# Patient Record
Sex: MALE | Race: WHITE
[De-identification: names, ages, dates, MRNs, and addresses within clinical notes are randomized per-mention and may not be internally consistent; named-entity substitution may affect disease eponyms.]

---

## 2021-07-07 ENCOUNTER — HOSPITAL ENCOUNTER (OUTPATIENT)
Dept: HOSPITAL 35 - CAT | Age: 71
End: 2021-07-07
Attending: INTERNAL MEDICINE
Payer: COMMERCIAL

## 2021-07-07 DIAGNOSIS — I48.91: ICD-10-CM

## 2021-07-07 DIAGNOSIS — I25.10: Primary | ICD-10-CM

## 2021-07-07 DIAGNOSIS — Z95.810: ICD-10-CM

## 2021-07-07 DIAGNOSIS — M47.814: ICD-10-CM

## 2021-07-07 LAB
ALBUMIN SERPL-MCNC: 3.6 G/DL (ref 3.4–5)
ALT SERPL-CCNC: 29 U/L (ref 30–65)
ANION GAP SERPL CALC-SCNC: 6 MMOL/L (ref 7–16)
AST SERPL-CCNC: 20 U/L (ref 15–37)
BASOPHILS NFR BLD AUTO: 0.7 % (ref 0–2)
BILIRUB SERPL-MCNC: 0.4 MG/DL (ref 0.2–1)
BUN SERPL-MCNC: 28 MG/DL (ref 7–18)
CALCIUM SERPL-MCNC: 8.8 MG/DL (ref 8.5–10.1)
CHLORIDE SERPL-SCNC: 110 MMOL/L (ref 98–107)
CO2 SERPL-SCNC: 28 MMOL/L (ref 21–32)
CREAT SERPL-MCNC: 1.2 MG/DL (ref 0.7–1.3)
EOSINOPHIL NFR BLD: 2.2 % (ref 0–3)
ERYTHROCYTE [DISTWIDTH] IN BLOOD BY AUTOMATED COUNT: 15 % (ref 10.5–14.5)
GLUCOSE SERPL-MCNC: 119 MG/DL (ref 74–106)
GRANULOCYTES NFR BLD MANUAL: 33.2 % (ref 36–66)
HCT VFR BLD CALC: 36.8 % (ref 42–52)
HGB BLD-MCNC: 12.2 GM/DL (ref 14–18)
LG PLATELETS BLD QL SMEAR: (no result)
LYMPHOCYTES NFR BLD AUTO: 42.9 % (ref 24–44)
MCH RBC QN AUTO: 29.9 PG (ref 26–34)
MCHC RBC AUTO-ENTMCNC: 33 G/DL (ref 28–37)
MCV RBC: 90.7 FL (ref 80–100)
MONOCYTES NFR BLD: 21 % (ref 1–8)
NEUTROPHILS # BLD: 1.7 THOU/UL (ref 1.4–8.2)
PLATELET # BLD: 123 THOU/UL (ref 150–400)
POTASSIUM SERPL-SCNC: 4 MMOL/L (ref 3.5–5.1)
PROT SERPL-MCNC: 6.3 G/DL (ref 6.4–8.2)
RBC # BLD AUTO: 4.06 MIL/UL (ref 4.5–6)
SODIUM SERPL-SCNC: 144 MMOL/L (ref 136–145)
WBC # BLD AUTO: 5.2 THOU/UL (ref 4–11)

## 2021-07-09 ENCOUNTER — HOSPITAL ENCOUNTER (OUTPATIENT)
Dept: HOSPITAL 35 - CATH | Age: 71
Setting detail: OBSERVATION
LOS: 1 days | Discharge: HOME | End: 2021-07-10
Attending: INTERNAL MEDICINE | Admitting: INTERNAL MEDICINE
Payer: COMMERCIAL

## 2021-07-09 VITALS — SYSTOLIC BLOOD PRESSURE: 149 MMHG | DIASTOLIC BLOOD PRESSURE: 62 MMHG

## 2021-07-09 VITALS — SYSTOLIC BLOOD PRESSURE: 151 MMHG | DIASTOLIC BLOOD PRESSURE: 61 MMHG

## 2021-07-09 VITALS — SYSTOLIC BLOOD PRESSURE: 147 MMHG | DIASTOLIC BLOOD PRESSURE: 77 MMHG

## 2021-07-09 VITALS — HEIGHT: 71 IN | BODY MASS INDEX: 26.04 KG/M2 | WEIGHT: 186 LBS

## 2021-07-09 VITALS — DIASTOLIC BLOOD PRESSURE: 58 MMHG | SYSTOLIC BLOOD PRESSURE: 145 MMHG

## 2021-07-09 VITALS — DIASTOLIC BLOOD PRESSURE: 63 MMHG | SYSTOLIC BLOOD PRESSURE: 152 MMHG

## 2021-07-09 VITALS — DIASTOLIC BLOOD PRESSURE: 64 MMHG | SYSTOLIC BLOOD PRESSURE: 149 MMHG

## 2021-07-09 DIAGNOSIS — I10: ICD-10-CM

## 2021-07-09 DIAGNOSIS — E78.5: ICD-10-CM

## 2021-07-09 DIAGNOSIS — Z79.899: ICD-10-CM

## 2021-07-09 DIAGNOSIS — I48.91: Primary | ICD-10-CM

## 2021-07-09 LAB
ALBUMIN SERPL-MCNC: 3.7 G/DL (ref 3.4–5)
ALT SERPL-CCNC: 32 U/L (ref 16–63)
ANION GAP SERPL CALC-SCNC: 11 MMOL/L (ref 7–16)
APTT BLD: 27.4 SECONDS (ref 24.5–32.8)
AST SERPL-CCNC: 19 U/L (ref 15–37)
BASOPHILS NFR BLD AUTO: 0 % (ref 0–2)
BILIRUB SERPL-MCNC: 0.3 MG/DL (ref 0.2–1)
BUN SERPL-MCNC: 33 MG/DL (ref 7–18)
CALCIUM SERPL-MCNC: 8.6 MG/DL (ref 8.5–10.1)
CHLORIDE SERPL-SCNC: 111 MMOL/L (ref 98–107)
CO2 SERPL-SCNC: 25 MMOL/L (ref 21–32)
CREAT SERPL-MCNC: 1.6 MG/DL (ref 0.7–1.3)
EOSINOPHIL NFR BLD: 5 % (ref 0–3)
ERYTHROCYTE [DISTWIDTH] IN BLOOD BY AUTOMATED COUNT: 14.9 % (ref 10.5–14.5)
GLUCOSE SERPL-MCNC: 103 MG/DL (ref 74–106)
GRANULOCYTES NFR BLD MANUAL: 29 % (ref 36–66)
HCT VFR BLD CALC: 36.2 % (ref 42–52)
HGB BLD-MCNC: 12 GM/DL (ref 14–18)
INR PPP: 1
LG PLATELETS BLD QL SMEAR: (no result)
LYMPHOCYTES NFR BLD AUTO: 44 % (ref 24–44)
MCH RBC QN AUTO: 30 PG (ref 26–34)
MCHC RBC AUTO-ENTMCNC: 33.1 G/DL (ref 28–37)
MCV RBC: 90.9 FL (ref 80–100)
MONOCYTES NFR BLD: 20 % (ref 1–8)
NEUTROPHILS # BLD: 1.6 THOU/UL (ref 1.4–8.2)
NEUTS BAND NFR BLD: 1 % (ref 0–8)
PLATELET # BLD: 128 THOU/UL (ref 150–400)
POTASSIUM SERPL-SCNC: 4.1 MMOL/L (ref 3.5–5.1)
PROT SERPL-MCNC: 6.2 G/DL (ref 6.4–8.2)
PROTHROMBIN TIME: 10.9 SECONDS (ref 10.5–12.1)
RBC # BLD AUTO: 3.99 MIL/UL (ref 4.5–6)
SODIUM SERPL-SCNC: 147 MMOL/L (ref 136–145)
VARIANT LYMPHS NFR BLD MANUAL: 1 %
WBC # BLD AUTO: 5.4 THOU/UL (ref 4–11)

## 2021-07-09 PROCEDURE — 70005: CPT

## 2021-07-09 NOTE — NUR
Pt arrived from EP lab VSS, right groin dressing CDI, pulses present, foot
warm. Pt states he "feels good just ready for some dinner".

## 2021-07-09 NOTE — TEE
[facility]
[address]
[place]   [address]                   TRANSESOPHAGEAL ECHOCARDIOGRAM
_______________________________________________________________________________
 
Name:       FOSTER MORTENSEN               Room #:                     REG [facility]#:      [id number]                       Account #:      [id number]  
Admission:  07/09/21    Attend Phys:    Jaden Luciano MD
Discharge:              Date of Birth:  [birth year]  
                                                          Report #: [id number]
                                                                    [id number]
_______________________________________________________________________________
THIS REPORT FOR:  
 
cc:  Johnny Lang MD, Mohd I. MD Santiago, Patrick MD [facility]                                         
                                                                       ~
 
--------------- ADDENDUM APPROVED REPORT --------------
 
 
Study performed:  07/09/2021 08:53:49
 
EXAM: Comprehensive 2D, Doppler, and color-flow 
Echocardiogram 
Patient Location: Out-Patient   
Room #:  EP     
      Status:  routine
 
       BSA:         2.04
HR: 62 bpm  BP:          140/68 mmHg 
Rhythm: Pacemaker     
 
Other Information 
Study Quality: Good
 
Indications
Atrial Fibrillation
 
Echo Enhancing Agent
Indication: Rule out Shunt
Agent(s) / Amount(s) Used: Agitated Saline 7 cc
 
Procedure
After obtaining informed consent, patient underwent transesophageal 
echo in the EP Lab. 
Type of Sedation : General Anesthesia
Sedation was administered by Anesthesiologist. 
Sedation start time:  0856           Case end Time:  0901
Sedation was achieved intravenously with:  Fentanyl (100)    
Transesophageal probe was inserted and advanced into esophagus 
without difficulty by Henri Black MD.
Echo enhancement indication: R/O Septal defect.
Echo enhancement agent administered: Agitated Saline
The MAR was performed without complications. 
Throughout the procedure, the blood pressure, pulse oximetry, cardiac 
rhythm, and rate were monitored.
 
 
[facility]
[address]
[place]  [address]
Phone:  [phone]                    TRANSESOPHAGEAL ECHOCARDIOGRAM
_______________________________________________________________________________
 
Name:            FOSTER MORTENSEN               Room #:                    REG [facility]#:           [id number]          Account #:     [id number]  
Admission:       07/09/21         Attend Phys:   Jaden Luciano
Discharge:                  Date of Birth: [birth year]  
                         Report #:      [id number]
        [id number]
_______________________________________________________________________________
The patient tolerated the procedure without adverse effects. Recovery 
from conscious sedation was uneventful and vital signs were 
stable.
 
Left Ventricle
The left ventricle is normal size. There is normal LV segmental wall 
motion. There is normal left ventricular wall thickness. The left 
ventricular systolic function is normal. The left ventricular 
ejection fraction is within the normal range. LVEF is 60-65%.
 
Right Ventricle
The right ventricle is normal size. The right ventricular systolic 
function is normal. Pacemaker lead is present in the right ventricle. 
 
Atria
Left atrium is dilated. No thrombus is visualized in the left atrium 
or appendage. Right atrium is dilated. Pacemaker lead is present in 
the right atrium.
 
Aortic Valve
The aortic valve is normal in structure. No aortic regurgitation is 
present. There is no aortic valvular stenosis.
 
Mitral Valve
The mitral valve is normal in structure. Mild mitral regurgitation. 
No evidence of mitral valve stenosis.
 
Tricuspid Valve
The tricuspid valve is normal in structure. Mild tricuspid 
regurgitation.
 
Pulmonic Valve
The pulmonary valve is normal in structure. There is no pulmonic 
valvular regurgitation.
 
Great Vessels
The aortic root is normal in size.
 
Pericardium
There is no pericardial effusion.
 
<Conclusion>
Consent was obtained
Anesthesia performed by the anesthesiologist
After proper sedation esophageal probe was advanced without 
difficulty
 
 
[facility]
[address]
[place]  [address]
Phone:  [phone]                    TRANSESOPHAGEAL ECHOCARDIOGRAM
_______________________________________________________________________________
 
Name:            FOSTER MORTENSEN               Room #:                    REG [facility].#:           [id number]          Account #:     [id number]  
Admission:       07/09/21         Attend Phys:   Jaden Matachonnal
Discharge:                  Date of Birth: [birth year]  
                         Report #:      [id number]
        [id number]
_______________________________________________________________________________
Left atrial appendage moderate size, no obvious mass or clot 
detected
Normal left ventricle size/wall thickness ejection fraction of 
60%
Normal right ventricular size/function
Right atrium mildly dilated
Normal aortic valve structure and function
Mild mitral/tricuspid valve insufficiency
No evidence of ASD/VSD by color flow/bubble study
Normal aortic root size
No pericardial effusion
Aorta; minimal calcification detected
 
 
 
 
 
 
 
 
 
 
 
 
 
 
 
 
 
 
 
 
 
 
 
 
 
 
 
 
 
 
 
 
  <ELECTRONICALLY SIGNED>
   By: Henri Black MD, FACC    
  07/09/21     1114
D: 07/09/21 1114                           _____________________________________
T: 07/09/21 1114                           Henri Black MD, FACC      /INF

## 2021-07-10 VITALS — DIASTOLIC BLOOD PRESSURE: 74 MMHG | SYSTOLIC BLOOD PRESSURE: 153 MMHG

## 2021-07-10 VITALS — SYSTOLIC BLOOD PRESSURE: 153 MMHG | DIASTOLIC BLOOD PRESSURE: 69 MMHG

## 2021-07-10 VITALS — DIASTOLIC BLOOD PRESSURE: 79 MMHG | SYSTOLIC BLOOD PRESSURE: 150 MMHG

## 2021-07-10 VITALS — DIASTOLIC BLOOD PRESSURE: 79 MMHG | SYSTOLIC BLOOD PRESSURE: 155 MMHG

## 2021-07-10 VITALS — DIASTOLIC BLOOD PRESSURE: 71 MMHG | SYSTOLIC BLOOD PRESSURE: 153 MMHG

## 2021-07-10 LAB
ANION GAP SERPL CALC-SCNC: 12 MMOL/L (ref 7–16)
BUN SERPL-MCNC: 30 MG/DL (ref 7–18)
CALCIUM SERPL-MCNC: 8.4 MG/DL (ref 8.5–10.1)
CHLORIDE SERPL-SCNC: 108 MMOL/L (ref 98–107)
CO2 SERPL-SCNC: 25 MMOL/L (ref 21–32)
CREAT SERPL-MCNC: 1.3 MG/DL (ref 0.7–1.3)
GLUCOSE SERPL-MCNC: 138 MG/DL (ref 74–106)
POTASSIUM SERPL-SCNC: 3.9 MMOL/L (ref 3.5–5.1)
SODIUM SERPL-SCNC: 145 MMOL/L (ref 136–145)

## 2021-07-10 NOTE — NUR
ASSUMED CARE AT 1900. PT DENIES SOB OR NAUSEA. C/O MILD CHEST PAIN THAT
IMPROVED AFTER SITTING UP. REPORTED TROUBLE FALLING ASLEEP. RIGHT GROIN SITE
C/D/I AND WITHOUT HEMATOMA OR BRUISING. NO OTHER CONCERNS, WILL CONTINUE TO
MONITOR.

## 2021-07-12 NOTE — P
[facility]
[address]
[place]   [address]                     PROCEDURE REPORT              
_______________________________________________________________________________
 
Name:       FOSTER MORTENSEN               Room #:         217-P       [facility]#:      [id number]                       Account #:      [id number]  
Admission:  07/09/21    Attend Phys:    Jaden Luciano MD
Discharge:  07/10/21    Date of Birth:  [birth year]  
                                                          Report #: [id number]
                                                                    [id number] 
_______________________________________________________________________________
THIS REPORT FOR:  
 
cc:  Johnny Lang MD, Mohd I. MD Couchonnal, Luis F. MD                                        ~
 
 
ATRIAL FIBRILLATION ABLATION
 
PREOPERATIVE DIAGNOSIS:  Atrial fibrillation.
 
POSTOPERATIVE DIAGNOSES:
1.  Atrial fibrillation
2.  Typical atrial flutter.
 
HISTORY:  The patient is a 71-year-old male with a history of recurrent 
paroxysmal atrial fibrillation as well as sick sinus syndrome, status post 
pacemaker implantation.  He also has a history of leukemia and is on oral 
chemotherapeutic agents, which are now on hold, so that he can be anticoagulated
for this procedure.  He is here for atrial fibrillation and atrial flutter 
ablation.
 
PROCEDURE PERFORMED:
1.  Atrial fibrillation ablation -- CPT code 90024.
2.  3D mapping.  CPT code 16290.
3.  Intracardiac echo, CPT code 19471.
4.  Second pathway ablation -- CPT code 94731.
5.  Preprocedural pacemaker reprogramming, CPT code 50014.
6.  Post-procedural pacemaker reprogramming, CPT code 79025.
 
DESCRIPTION OF PROCEDURE:  The patient was brought to the EP laboratory in a 
fasting and sedated state, prepped and draped in a sterile fashion.  I injected 
lidocaine at the right groin and obtained access to the right femoral vein x 3, 
placing an 8, 9 and 7-[place]izen of [place] short sheath.  Prior to the ablation, I 
reprogrammed his pacemaker to the VVI mode.  Of note, he was noted to be in 
atrial fibrillation today and this had been going on for approximately 6 hours 
per the device interrogation.  Next, under fluoroscopy, I placed a decapolar 
catheter into the coronary sinus and ICE catheter into the right atrium.  Using 
intracardiac ultrasound, I created a 3D geometry of the left atrium with 
evidence of two left and two right pulmonary veins.  This was merged with the 
patient's cardiac CT scan.  The patient was then systemically heparinized and a 
transseptal was performed using SL1 sheath and a Fairborn needle.  This was 
straightforward and then I exchanged the SL1 sheath for the Cryo sheath.  Next,,
I placed a Lasso catheter in the left atrium and created a 3D geometry and 
voltage map of the left atrium and then we started by isolating the pulmonary 
veins.  The left superior pulmonary vein underwent two 4-minute freezes and 
 
 
 
[address]   [address]                     PROCEDURE REPORT              
_______________________________________________________________________________
 
Name:       FOSTER MORTENSEN               Room #:         217-P       [facility]#:      [id number]                       Account #:      [id number]  
Admission:  07/09/21    Attend Phys:    Jaden Luciano MD
Discharge:  07/10/21    Date of Birth:  [birth year]  
                                                          Report #: [id number]
                                                                    [id number] 
_______________________________________________________________________________
 
appeared to be isolated.  I then turned my attention in the left inferior 
pulmonary vein and performed a 300-second freeze and the vein isolated at 46 
seconds.  I then turned my attention to the right superior pulmonary vein and 
performed a 4-minute followed by 3-minute freeze, which resulted in isolation.  
The right inferior pulmonary vein underwent two 4-minute freezes and appeared to
be isolated.  At this point, the patient was cardioverted and was in sinus 
rhythm, but then went into typical atrial flutter with a negative sawtooth 
flutter waves in the inferior leads.  Proximal and distal activation along the 
CS and cardiac cycle length of 320 milliseconds.  I re-interrogated the 
pulmonary veins and it appeared that the left veins had reconnected.  Therefore,
the left superior pulmonary vein underwent an additional 3-minute freeze and a 
2-minute freeze which did not result in isolation.  Therefore, I decided to go 
back to the left inferior pulmonary vein and I performed a 300-second freeze, 
which resulted in isolation in 62 seconds.  When I went back to interrogate the 
left inferior pulmonary vein, this appeared to be isolated.  I did pace the 
patient out of atrial flutter and I performed pacing from the left superior 
pulmonary vein and this appeared to be isolated and inserted the left inferior 
pulmonary vein.  The right-sided veins remained isolated from the prior 
ablation.
 
ATRIAL FLUTTER ABLATION:  Next, the patient was prepped for atrial flutter 
ablation.  An 8 mm ablation catheter was placed into the right atrium via the 
ramp sheath.  Ablation was performed at 70 caballero and 60 degrees.  Prior to 
ablation, the transisthmus conduction time was around 20 milliseconds and 
post-ablation transisthmus conduction time was now 200 milliseconds and with an 
activation sequence consistent with bidirectional block.  As such, the patient 
was back in sinus rhythm and doing well.  Intracardiac ultrasound showed no 
evidence of pericardial effusion.  The patient received systemic protamine and 
catheters and sheaths were pulled and a figure-of-eight suture was performed in 
the right groin region.  The pacemaker was interrogated and found to be 
functioning normally and I programmed the device back to the MVP mode and I did 
put on atrial preference pacing to suppress any further atrial arrhythmias.  The
patient awoke neurologically and hemodynamically intact.
 
CONCLUSION:
1.  Successful AFib ablation with isolation of the pulmonary veins.
2.  Successful atrial flutter ablation with bidirectional block.
3.  Successful pacemaker reprogramming.
 
 
 
 
 
  <ELECTRONICALLY SIGNED>
   By: Jaden Luciano MD        
  07/12/21     1142
D: 07/09/21 1349                           _____________________________________
T: 07/09/21 2329                           Jaden Luciano MD          /nt

## 2021-07-20 ENCOUNTER — HOSPITAL ENCOUNTER (OUTPATIENT)
Dept: HOSPITAL 35 - SJCVC | Age: 71
End: 2021-07-20
Attending: INTERNAL MEDICINE
Payer: COMMERCIAL

## 2021-07-20 DIAGNOSIS — I25.10: ICD-10-CM

## 2021-07-20 DIAGNOSIS — Z72.89: ICD-10-CM

## 2021-07-20 DIAGNOSIS — R94.31: Primary | ICD-10-CM

## 2021-07-20 DIAGNOSIS — I42.9: ICD-10-CM

## 2021-07-20 DIAGNOSIS — I48.0: ICD-10-CM

## 2021-07-20 DIAGNOSIS — Z79.899: ICD-10-CM

## 2021-07-20 DIAGNOSIS — Z79.82: ICD-10-CM

## 2021-07-20 DIAGNOSIS — Z88.5: ICD-10-CM

## 2021-07-20 DIAGNOSIS — Z95.0: ICD-10-CM

## 2021-07-20 DIAGNOSIS — I49.5: ICD-10-CM

## 2021-07-20 DIAGNOSIS — E78.5: ICD-10-CM

## 2021-07-20 DIAGNOSIS — Z87.891: ICD-10-CM

## 2021-07-21 ENCOUNTER — HOSPITAL ENCOUNTER (OUTPATIENT)
Dept: HOSPITAL 35 - CATH | Age: 71
Discharge: HOME | End: 2021-07-21
Attending: INTERNAL MEDICINE
Payer: COMMERCIAL

## 2021-07-21 VITALS — HEIGHT: 71 IN | WEIGHT: 180.34 LBS | BODY MASS INDEX: 25.25 KG/M2

## 2021-07-21 VITALS — SYSTOLIC BLOOD PRESSURE: 132 MMHG | DIASTOLIC BLOOD PRESSURE: 67 MMHG

## 2021-07-21 DIAGNOSIS — Z79.891: ICD-10-CM

## 2021-07-21 DIAGNOSIS — Z79.899: ICD-10-CM

## 2021-07-21 DIAGNOSIS — Z79.01: ICD-10-CM

## 2021-07-21 DIAGNOSIS — Z98.890: ICD-10-CM

## 2021-07-21 DIAGNOSIS — I48.91: Primary | ICD-10-CM

## 2021-07-21 DIAGNOSIS — Z95.0: ICD-10-CM

## 2021-07-21 DIAGNOSIS — I25.10: ICD-10-CM

## 2021-07-21 DIAGNOSIS — E78.5: ICD-10-CM

## 2021-07-21 DIAGNOSIS — Z82.49: ICD-10-CM

## 2021-07-21 DIAGNOSIS — I10: ICD-10-CM

## 2021-07-21 LAB
ALBUMIN SERPL-MCNC: 3.4 G/DL (ref 3.4–5)
ALT SERPL-CCNC: 27 U/L (ref 16–63)
ANION GAP SERPL CALC-SCNC: 11 MMOL/L (ref 7–16)
APTT BLD: 29.1 SECONDS (ref 24.5–32.8)
AST SERPL-CCNC: 20 U/L (ref 15–37)
BASOPHILS NFR BLD AUTO: 0.4 % (ref 0–2)
BILIRUB SERPL-MCNC: 0.4 MG/DL (ref 0.2–1)
BUN SERPL-MCNC: 29 MG/DL (ref 7–18)
CALCIUM SERPL-MCNC: 9 MG/DL (ref 8.5–10.1)
CHLORIDE SERPL-SCNC: 110 MMOL/L (ref 98–107)
CO2 SERPL-SCNC: 25 MMOL/L (ref 21–32)
CREAT SERPL-MCNC: 1.7 MG/DL (ref 0.7–1.3)
EOSINOPHIL NFR BLD: 2.2 % (ref 0–3)
ERYTHROCYTE [DISTWIDTH] IN BLOOD BY AUTOMATED COUNT: 14.8 % (ref 10.5–14.5)
GLUCOSE SERPL-MCNC: 101 MG/DL (ref 74–106)
GRANULOCYTES NFR BLD MANUAL: 56.4 % (ref 36–66)
HCT VFR BLD CALC: 37.6 % (ref 42–52)
HGB BLD-MCNC: 12.4 GM/DL (ref 14–18)
INR PPP: 1
LYMPHOCYTES NFR BLD AUTO: 26.8 % (ref 24–44)
MCH RBC QN AUTO: 29.7 PG (ref 26–34)
MCHC RBC AUTO-ENTMCNC: 32.9 G/DL (ref 28–37)
MCV RBC: 90.1 FL (ref 80–100)
MONOCYTES NFR BLD: 14.2 % (ref 1–8)
NEUTROPHILS # BLD: 4.5 THOU/UL (ref 1.4–8.2)
PLATELET # BLD: 156 THOU/UL (ref 150–400)
POTASSIUM SERPL-SCNC: 3.9 MMOL/L (ref 3.5–5.1)
PROT SERPL-MCNC: 6.3 G/DL (ref 6.4–8.2)
PROTHROMBIN TIME: 10.9 SECONDS (ref 10.5–12.1)
RBC # BLD AUTO: 4.17 MIL/UL (ref 4.5–6)
SODIUM SERPL-SCNC: 146 MMOL/L (ref 136–145)
WBC # BLD AUTO: 7.9 THOU/UL (ref 4–11)

## 2021-07-21 PROCEDURE — 62110: CPT

## 2021-07-21 PROCEDURE — 62900: CPT

## 2021-08-02 NOTE — P
[facility]
[address]
[place]   [address]                     PROCEDURE REPORT              
_______________________________________________________________________________
 
Name:       FOSTER MORTENSEN               Room #:                     REG [facility].#:      [id number]                       Account #:      [id number]  
Admission:  07/21/21    Attend Phys:    Jaden Luciano MD
Discharge:              Date of Birth:  [birth year]  
                                                          Report #: [id number]
                                                                    [id number] 
_______________________________________________________________________________
THIS REPORT FOR:  
 
cc:  Johnny Lang MD, Mohd I. MD Couchonnal, Luis F. MD                                        ~
 
DATE OF SERVICE: 07/21/2021
 
PREOPERATIVE DIAGNOSIS:  Atrial fibrillation.
 
POSTOPERATIVE DIAGNOSIS:  Atrial fibrillation.
 
DESCRIPTION OF PROCEDURE:  The patient underwent informed consent.  He was 
prepped in a standard fashion.  Patches placed in AP position.  He was then 
sedated by the anesthesiology service and underwent a 200 joule synchronized 
cardioversion with restoration of sinus rhythm.  No procedure related 
complications.
 
CONCLUSION:  Successful direct current cardioversion with restoration of sinus 
rhythm.
 
 
 
 
 
 
 
 
 
 
 
 
 
 
 
 
 
 
 
 
 
 
 
 
  <ELECTRONICALLY SIGNED>
   By: Jaden Luciano MD        
  08/02/21     0838
D: 07/23/21 0857                           _____________________________________
T: 07/23/21 1851                           Jaden Luciano MD          /nt

## 2021-09-07 ENCOUNTER — HOSPITAL ENCOUNTER (OUTPATIENT)
Dept: HOSPITAL 35 - CATH | Age: 71
Discharge: HOME | End: 2021-09-07
Attending: INTERNAL MEDICINE
Payer: COMMERCIAL

## 2021-09-07 VITALS — WEIGHT: 180 LBS | BODY MASS INDEX: 25.2 KG/M2 | HEIGHT: 71 IN

## 2021-09-07 VITALS — DIASTOLIC BLOOD PRESSURE: 93 MMHG | SYSTOLIC BLOOD PRESSURE: 186 MMHG

## 2021-09-07 DIAGNOSIS — Z98.890: ICD-10-CM

## 2021-09-07 DIAGNOSIS — I10: ICD-10-CM

## 2021-09-07 DIAGNOSIS — Z53.8: ICD-10-CM

## 2021-09-07 DIAGNOSIS — Z79.01: ICD-10-CM

## 2021-09-07 DIAGNOSIS — Z79.899: ICD-10-CM

## 2021-09-07 DIAGNOSIS — Z88.8: ICD-10-CM

## 2021-09-07 DIAGNOSIS — Z20.822: ICD-10-CM

## 2021-09-07 DIAGNOSIS — E78.5: ICD-10-CM

## 2021-09-07 DIAGNOSIS — I48.91: Primary | ICD-10-CM

## 2021-09-07 DIAGNOSIS — I25.10: ICD-10-CM

## 2021-09-07 LAB
ALBUMIN SERPL-MCNC: 3.6 G/DL (ref 3.4–5)
ALT SERPL-CCNC: 36 U/L (ref 30–65)
ANION GAP SERPL CALC-SCNC: 5 MMOL/L (ref 7–16)
APTT BLD: 32.8 SECONDS (ref 24.5–32.8)
AST SERPL-CCNC: 24 U/L (ref 15–37)
BASOPHILS NFR BLD AUTO: 0.2 % (ref 0–2)
BILIRUB SERPL-MCNC: 0.3 MG/DL (ref 0.2–1)
BUN SERPL-MCNC: 26 MG/DL (ref 7–18)
CALCIUM SERPL-MCNC: 8.6 MG/DL (ref 8.5–10.1)
CHLORIDE SERPL-SCNC: 106 MMOL/L (ref 98–107)
CO2 SERPL-SCNC: 31 MMOL/L (ref 21–32)
CREAT SERPL-MCNC: 1.5 MG/DL (ref 0.7–1.3)
EOSINOPHIL NFR BLD: 3.3 % (ref 0–3)
ERYTHROCYTE [DISTWIDTH] IN BLOOD BY AUTOMATED COUNT: 14.4 % (ref 10.5–14.5)
GLUCOSE SERPL-MCNC: 96 MG/DL (ref 74–106)
GRANULOCYTES NFR BLD MANUAL: 52.1 % (ref 36–66)
HCT VFR BLD CALC: 40.4 % (ref 42–52)
HGB BLD-MCNC: 13.1 GM/DL (ref 14–18)
INR PPP: 0.96
LYMPHOCYTES NFR BLD AUTO: 33.8 % (ref 24–44)
MCH RBC QN AUTO: 28.7 PG (ref 26–34)
MCHC RBC AUTO-ENTMCNC: 32.5 G/DL (ref 28–37)
MCV RBC: 88.3 FL (ref 80–100)
MONOCYTES NFR BLD: 10.6 % (ref 1–8)
NEUTROPHILS # BLD: 3.7 THOU/UL (ref 1.4–8.2)
PLATELET # BLD: 141 THOU/UL (ref 150–400)
POTASSIUM SERPL-SCNC: 4 MMOL/L (ref 3.5–5.1)
PROT SERPL-MCNC: 6.6 G/DL (ref 6.4–8.2)
PROTHROMBIN TIME: 10.5 SECONDS (ref 10.5–12.1)
RBC # BLD AUTO: 4.57 MIL/UL (ref 4.5–6)
SODIUM SERPL-SCNC: 142 MMOL/L (ref 136–145)
WBC # BLD AUTO: 7.1 THOU/UL (ref 4–11)

## 2021-09-07 NOTE — NUR
PT APPEARED TO BE IN AFIB WHEN FIRST PLACED ON MONITOR. BUT WHEN PACEMAKER
INTEROGATED FOUND TO BE IN SINUS RHYTHM. SO PT NOT CARDIOVERTED. IV DC'D. PT
TO CONTINUE AMIODARONE  MG DAILY AND CONTINUE ELIQUIS FOR FIVE MORE
DAYS. PT AND WIFE VERBALIZE UNDERSTANDING. ESCORTED TO EXIT.

## 2021-10-06 ENCOUNTER — HOSPITAL ENCOUNTER (OUTPATIENT)
Dept: HOSPITAL 35 - SJCVC | Age: 71
End: 2021-10-06
Attending: INTERNAL MEDICINE
Payer: COMMERCIAL

## 2021-10-06 DIAGNOSIS — Z79.82: ICD-10-CM

## 2021-10-06 DIAGNOSIS — Z87.891: ICD-10-CM

## 2021-10-06 DIAGNOSIS — Z95.0: ICD-10-CM

## 2021-10-06 DIAGNOSIS — Z88.5: ICD-10-CM

## 2021-10-06 DIAGNOSIS — I49.5: ICD-10-CM

## 2021-10-06 DIAGNOSIS — I48.0: ICD-10-CM

## 2021-10-06 DIAGNOSIS — Z79.899: ICD-10-CM

## 2021-10-06 DIAGNOSIS — C91.10: ICD-10-CM

## 2021-10-06 DIAGNOSIS — Z72.89: ICD-10-CM

## 2021-10-06 DIAGNOSIS — R94.31: Primary | ICD-10-CM
